# Patient Record
Sex: FEMALE
[De-identification: names, ages, dates, MRNs, and addresses within clinical notes are randomized per-mention and may not be internally consistent; named-entity substitution may affect disease eponyms.]

---

## 2022-08-15 ENCOUNTER — HOSPITAL ENCOUNTER (EMERGENCY)
Dept: HOSPITAL 90 - EDH | Age: 22
Discharge: HOME | End: 2022-08-15
Payer: COMMERCIAL

## 2022-08-15 VITALS — HEIGHT: 60 IN | WEIGHT: 132.06 LBS | BODY MASS INDEX: 25.93 KG/M2

## 2022-08-15 VITALS — DIASTOLIC BLOOD PRESSURE: 79 MMHG | SYSTOLIC BLOOD PRESSURE: 105 MMHG

## 2022-08-15 DIAGNOSIS — N39.0: Primary | ICD-10-CM

## 2022-08-15 LAB
APPEARANCE UR: (no result)
BILIRUB UR QL STRIP: NEGATIVE
COLOR UR: YELLOW
DEPRECATED SQUAMOUS URNS QL MICRO: (no result) /HPF (ref 0–2)
GLUCOSE UR STRIP-MCNC: NEGATIVE MG/DL
HGB UR QL STRIP: (no result)
KETONES UR STRIP-MCNC: NEGATIVE MG/DL
LEUKOCYTE ESTERASE UR QL STRIP: (no result)
NITRITE UR QL STRIP: NEGATIVE
PH UR STRIP: 7 [PH] (ref 5–8)
PROT UR QL STRIP: 30 MG/DL
RBC #/AREA URNS HPF: (no result) /HPF (ref 0–1)
SP GR UR STRIP: 1 (ref 1–1.03)
UROBILINOGEN UR STRIP-MCNC: 0.2 MG/DL (ref 0.2–1)

## 2022-08-15 PROCEDURE — 87088 URINE BACTERIA CULTURE: CPT

## 2022-08-15 PROCEDURE — 81025 URINE PREGNANCY TEST: CPT

## 2022-08-15 PROCEDURE — 81001 URINALYSIS AUTO W/SCOPE: CPT

## 2022-08-15 PROCEDURE — 96372 THER/PROPH/DIAG INJ SC/IM: CPT

## 2022-08-15 PROCEDURE — 87077 CULTURE AEROBIC IDENTIFY: CPT

## 2022-08-15 PROCEDURE — 87186 SC STD MICRODIL/AGAR DIL: CPT

## 2022-08-15 PROCEDURE — 99283 EMERGENCY DEPT VISIT LOW MDM: CPT

## 2024-12-12 ENCOUNTER — HOSPITAL ENCOUNTER (EMERGENCY)
Dept: HOSPITAL 90 - EDH | Age: 24
Discharge: HOME | End: 2024-12-12
Payer: SELF-PAY

## 2024-12-12 VITALS — BODY MASS INDEX: 25.54 KG/M2 | HEIGHT: 60 IN | WEIGHT: 130.07 LBS

## 2024-12-12 VITALS
HEART RATE: 103 BPM | OXYGEN SATURATION: 96 % | TEMPERATURE: 99.2 F | DIASTOLIC BLOOD PRESSURE: 84 MMHG | SYSTOLIC BLOOD PRESSURE: 123 MMHG | RESPIRATION RATE: 18 BRPM

## 2024-12-12 DIAGNOSIS — Z20.822: ICD-10-CM

## 2024-12-12 DIAGNOSIS — B97.89: ICD-10-CM

## 2024-12-12 DIAGNOSIS — J02.8: Primary | ICD-10-CM

## 2024-12-12 DIAGNOSIS — Z79.899: ICD-10-CM

## 2024-12-12 LAB — SARS-COV-2 AG RESP QL IA.RAPID: (no result)

## 2024-12-12 PROCEDURE — 96372 THER/PROPH/DIAG INJ SC/IM: CPT

## 2024-12-12 PROCEDURE — 87880 STREP A ASSAY W/OPTIC: CPT

## 2024-12-12 PROCEDURE — 87426 SARSCOV CORONAVIRUS AG IA: CPT

## 2024-12-12 PROCEDURE — 87804 INFLUENZA ASSAY W/OPTIC: CPT

## 2024-12-12 PROCEDURE — 99283 EMERGENCY DEPT VISIT LOW MDM: CPT

## 2024-12-12 NOTE — ERN
General


Chief Complaint:  Sore Throat


Stated Complaint:  SORE THROAT


Time Seen by MD:  06:46





History of Present Illness


Initial Comments


Otherwise healthy 24-year-old female who presents for sore throat and body aches

for 48 hours.  Patient reports sore throat increased with swallowing.  No cough 

or congestion.  No rhinorrhea.  No ear discomfort.  No abdominal pain vomiting 

or diarrhea.  Decreased p.o. intake due to the sore throat but he was able to 

drink liquids.  No respiratory distress.


Allergies:  


Coded Allergies:  


     No Known Allergies (Unverified  Allergy, Unknown, 8/15/22)


Home Meds


Active Scripts


Phenazopyridine HCl (Pyridium) 200 Mg Tab, 200 MG PO TIDPC, #15 TAB


   TAKE WITH FOOD TO PREVENT STOMACH


   UPSET.


   Prov:PARVEEN GOINS MD         8/15/22


Ibuprofen (Ibuprofen) 600 Mg Tablet, 600 MG PO Q6H PRN for PAIN, #30 TAB


   Prov:PARVEEN GOINS MD         8/15/22


Cephalexin Monohydrate (Keflex) 500 Mg Cap, 500 MG PO BID for 5 Days, #10 CAP


   Prov:PARVEEN GOINS MD         8/15/22





Past Medical History


Past Medical History:  No Pertinent History


Past Surgical History:  None





Social History


Social History:  Drugs, ETOH, Lives with family





Female(pregnancy History)


Pregnancy History:  Not Applicable


LMP:  Dec 9, 2024





ROS Dictation


CONSTITUTIONAL:  No chills, no fever, no weakness, no diaphoresis, no malaise.


HEAD/FACE:  No signs of trauma. 


EENT:  Sore throat 


RESPIRATORY:  No cough, no orthopnea, no SOB, no stridor, no wheezing. 


CARDIOVASCULAR:  No chest pain, no edema, no palpitations, no syncope. 


GASTROINTESTINAL/ABDOMINAL:   No abdominal pain, no constipation, no diarrhea, 

no nausea, no vomiting. 


GENITOURINARY:  No abnormal discharge, no dysuria, no frequent urination, no hem

aturia. No complaints of pain in the genitals. 


MUSCULOSKELETAL:  No back pain, no gout, no joint pain, no joint swelling, no mu

scle pain, no muscle stiffness, no neck pain. 


INTEGUMENTARY:  No change in color, no change in hair/nails, no dryness, no 

lesion, no lumps, no rash. 


NEUROLOGICAL/PSYCH:  No anxiety, not depressed, no emotional problem, no 

headache, no numbness, no pre-existing deficit, no history of seizures,  no 

tremors, no weakness. 


HEMATOLOGIC/LYMPHATIC:  Not anemic, no history of blood clots, no apparent 

bleeding, no bruising, glands not swollen.


All Systems Negative, Except as Noted.





Physical Exam


Physical Exam Dictation


VITAL SIGNS:  Reviewed. 


GENERAL APPEARANCE:  Alert, oriented x3, no acute distress, nontoxic


HEAD AND FACE: Non-traumatic. 


EYES:   PERRL, pink conjunctivas, eyelid no trauma, anterior chamber clear. 


EARS:  Pinnas intact and no signs of trauma or erythema.  Ear canals clear and 

no discharge. TMs no erythema. 


NOSE:  No discharge, no bleeding. 


OROPHARYNX:  Bilateral tonsillar exudates, uvula is midline, some cervical chain

lymphadenopathy bilaterally.  No obvious abscesses.  Nontoxic.


NECK:  Supple, non-tender, no thyromegaly, no masses, no JVD, no bruits. 


BREAST:  Deferred.


CHEST:   No tenderness, no crepitus, no paradoxical movement, no retractions.


LUNGS:  Clear, well-ventilated, symmetric, no rales, no wheezing, no rhonchi, no

stridor, good breath sounds bilaterally. 


HEART:  Regular rate, regular rhythm, no murmur, no gallops. 


VASCULAR: No peripheral edema.


ABDOMEN: Soft, positive bowel sounds, nondistended, no guarding, nontender, no 

rebound, no masses no hepatomegaly, no splenomegaly, no Flores's sign, no 

hernias. 


RECTAL: Deferred. 


GENITAL:  Deferred. 


NEUROLOGICAL:  Normal speech, gross motor function intact, gross sensory 

function intact.


MUSCULOSKELETAL:  Neck nontender, full range of motion, back nontender, full 

range of motion.


EXTREMITIES: Nontender, full range of motion. 


SKIN:  Color pink, dry, no turgor, no rash, no lacerations, no abrasions, no 

contusions.


LYMPHATICS:  Deferred.





Results


Laboratory and Microbiology


Lab and Micro Result





Laboratory Tests








Test


 12/12/24


06:37 12/12/24


06:40


 


Group A Streptococcus Rapid


 negative


(NEGATIVE) 





 


Influenza Type A Antigen


 


 Negative For


Type A


 


Influenza Type B Antigen


 


 Negative For


Type B


 


SARS-CoV-2 Antigen (Rapid)


 


 PRESUMPTIVE


NEGATIVE











MDM


CC:  Sore throat 


Historian:  Patient 


Comorbidities:  None


Limitations by social determinants of health:  None


Differential diagnosis:  Viral versus bacterial pharyngitis, viral URI, abscess,

other 


Vital signs:  Mild tachycardia otherwise stable.  


Physical exam: Bilateral tonsillar swelling with mild exudates.  No abscess.


No clinical signs of dehydration.  P.o. tolerant.  Nontoxic in appearance.  


The flu, COVID, and strep swabs per my independent interpretation are 

unremarkable.  Negative.  


Symptoms are consistent with a viral pharyngitis.


Patient given a dose of IM dexamethasone here in the ER for swelling and 

inflammation.  


Patient given ibuprofen.  


Patient denies pregnancy 


We will discharge with symptomatic care including ibuprofen Tylenol.  We will 

recommend PCP follow up.


ED Course





Orders








Procedure Category Date Status





  Time 


 


Rapid (Group A Strep) LAB 12/12/24 Complete





  06:33 


 


Covid19 (Sars Antigen LAB 12/12/24 Complete





Rapid)  06:40 


 


Influenza Type A & B, LAB 12/12/24 Complete





Rapid  06:40 


 


Dexamethasone 4mg/Ml PHA 12/12/24 Verified





1ml Vial (Dexametha  07:30 


 


Ibuprofen 800 Mg Tab PHA 12/12/24 Verified





 (Motrin)  07:30 








Vital Signs








  Date Time  Temp Pulse Resp B/P (MAP) Pulse Ox O2 Delivery O2 Flow Rate FiO2


 


12/12/24 06:49 99.0 104 18 130/77 98 Room Air* 0 21


 


12/12/24 06:30 99.1 100 18 137/93 96 Room Air 0 











DX & DISP


Disposition:  Discharge


Departure


Impression:  


   Primary Impression:  Viral pharyngitis


Condition:  Stable





Additional Instructions:  


Your symptoms are consistent with viral pharyngitis.  This is a sore throat 

caused by a viral infection.  This type of infection does not respond to 

antibiotics.  





Your flu, COVID, and strep swabs were negative.





Be sure to get plenty of rest and stay hydrated.  Drink plenty of water, an 

electrolyte solution such as Gatorade, herbal teas, and warm broths.  Avoid 

acidic and sugary drinks that may irritate your throat.  





Use over-the-counter pain and fever medications.  You can alternate 1000 mg of 

Tylenol and 800 mg of ibuprofen every 4 hours as needed for pain or discomfort. 







You can seizure throat with gargling warm salt water several times daily.





You can use over-the-counter throat lozenges.  





You can use a cool mist humidifier in your room to keep the air moist.  





Eat soft, bland foods that are easy to swallow, such as soups, yogurt, and 

applesauce.  Avoid spicy, crunchy, and acidic foods.





Viral infections often improve in 5-7 days.  If you have pain for longer than 

that please follow up with her primary doctor.





Please return to the emergency department if you have any severe throat pain 

with difficulty swallowing, swelling to her neck or face, difficulty breathing, 

or any other concerning symptom.


Referrals:  


SELF,REFERRAL (PCP)











YOLANDA GO DO                Dec 12, 2024 07:19